# Patient Record
Sex: FEMALE
[De-identification: names, ages, dates, MRNs, and addresses within clinical notes are randomized per-mention and may not be internally consistent; named-entity substitution may affect disease eponyms.]

---

## 2020-06-25 ENCOUNTER — HOSPITAL ENCOUNTER (OUTPATIENT)
Dept: HOSPITAL 88 - MRI | Age: 40
End: 2020-06-25
Attending: OTOLARYNGOLOGY
Payer: COMMERCIAL

## 2020-06-25 DIAGNOSIS — H90.5: Primary | ICD-10-CM

## 2020-06-25 PROCEDURE — 70553 MRI BRAIN STEM W/O & W/DYE: CPT

## 2020-06-25 NOTE — DIAGNOSTIC IMAGING REPORT
MRI BRAIN WOW



HISTORY: Left-sided hearing loss



COMPARISON:  None.



Technique: 

Multiplanar, multisequence MRI examination of the internal auditory canals and

brain was performed without and with intravenous, gadolinium-based contrast.

Multiplanar 3-D FIESTA images through the internal auditory canals were

included. 15 mL of MultiHance were administered. Susceptibility artifacts from

the oral cavity obscure some details.





DISCUSSION:



No pontine signal abnormalities are seen.

The cerebellopontine angles are clear. 

The internal auditory canals are normal in caliber. 

No abnormal internal auditory canal enhancement is seen. 

The cranial nerve 7/8 complexes are grossly unremarkable. 

The membranous labyrinths are grossly unremarkable.



Additional findings: None.



IMPRESSION:

Susceptibility artifacts from the oral cavity obscure some details. In spite of

limitations:



Unremarkable cerebellopontine angles, internal auditory canals, and membranous

labyrinths.



Signed by: Dr. Kostas Quan M.D. on 6/25/2020 2:28 PM

## 2021-08-27 ENCOUNTER — HOSPITAL ENCOUNTER (OUTPATIENT)
Dept: HOSPITAL 88 - MAMMO | Age: 41
End: 2021-08-27
Attending: OBSTETRICS & GYNECOLOGY
Payer: COMMERCIAL

## 2021-08-27 DIAGNOSIS — Z12.31: Primary | ICD-10-CM

## 2021-08-27 PROCEDURE — 77067 SCR MAMMO BI INCL CAD: CPT
